# Patient Record
Sex: FEMALE | Race: WHITE
[De-identification: names, ages, dates, MRNs, and addresses within clinical notes are randomized per-mention and may not be internally consistent; named-entity substitution may affect disease eponyms.]

---

## 2019-02-24 ENCOUNTER — HOSPITAL ENCOUNTER (EMERGENCY)
Dept: HOSPITAL 57 - BURERS | Age: 59
Discharge: HOME | End: 2019-02-24
Payer: COMMERCIAL

## 2019-02-24 DIAGNOSIS — J20.9: Primary | ICD-10-CM

## 2019-02-24 DIAGNOSIS — I10: ICD-10-CM

## 2019-02-24 PROCEDURE — 71046 X-RAY EXAM CHEST 2 VIEWS: CPT

## 2019-02-24 NOTE — RAD
CHEST 2 VIEWS:

 

DATE: 2/24/2019.

 

FINDINGS: 

No prior films were available for comparison.  No lobar consolidation or effusion was seen.  At most,
 there may be a slight increase in right lower lobe markings, but the finding is marginal.  The right
 hilum is slightly larger than the left, but this could all be due to pulmonary artery.  This might b
e watched on future films or if old films exist compared with.

 

IMPRESSION: 

1.  No definite acute findings.  Equivocal accentuation of right lower lobe markings.  Minimal infect
ion here is not conclusively ruled out.

 

2.  Very slight prominence of right hilum compared to left, though the patient is ever so slightly of
f center.  This might be looked at in a future chest film after she has resolved her current symptoms
.

 

 

CODE T

 

POS: HOME